# Patient Record
Sex: FEMALE | Race: WHITE
[De-identification: names, ages, dates, MRNs, and addresses within clinical notes are randomized per-mention and may not be internally consistent; named-entity substitution may affect disease eponyms.]

---

## 2018-10-18 ENCOUNTER — HOSPITAL ENCOUNTER (EMERGENCY)
Dept: HOSPITAL 38 - CC.ED | Age: 28
Discharge: HOME | End: 2018-10-18
Payer: MEDICAID

## 2018-10-18 VITALS — SYSTOLIC BLOOD PRESSURE: 152 MMHG | DIASTOLIC BLOOD PRESSURE: 80 MMHG

## 2018-10-18 DIAGNOSIS — Z34.93: Primary | ICD-10-CM

## 2018-10-18 DIAGNOSIS — Z3A.38: ICD-10-CM

## 2018-10-18 DIAGNOSIS — Z79.82: ICD-10-CM

## 2018-10-18 DIAGNOSIS — Z88.5: ICD-10-CM

## 2018-10-18 DIAGNOSIS — I10: ICD-10-CM

## 2018-10-18 DIAGNOSIS — Z91.040: ICD-10-CM

## 2018-10-18 NOTE — EDM.PDOC
ED HPI GENERAL MEDICAL PROBLEM





- General


Chief Complaint: OB/GYN Problem


Stated Complaint: "Having back and labor pain


Time Seen by Provider: 10/18/18 02:40


Source of Information: Reports: Patient


History Limitations: Reports: No Limitations





- History of Present Illness


INITIAL COMMENTS - FREE TEXT/NARRATIVE: 





Pt comes in tonight to determine if she is in labor or having [place].  

She states that she started to have contractions about midnight.  She says they 

lasted about 20 seconds and were at least 15 minutes apart.  Has not had any 

vaginal discharge, bleeding or noted that her water broke.  No urinary symptoms 

noted.  Is 38 2/7 weeks and is scheduled  with Dr. Maier on . Did see Dr. Maier last week.  She did try taking a shower and walking 

around and they continued every 15 minutes at home.  Is very comfortable 

between contractions visiting.  Pelvic exam done using sterile technique is 

very posterior cervix and very high. Nursing did fetal heart tones in the 140'

s.  Baby very active.


Onset: Gradual


Location: Reports: Abdomen


  ** Back


Pain Score (Numeric/FACES): 6





- Related Data


 Allergies











Allergy/AdvReac Type Severity Reaction Status Date / Time


 


acetaminophen [From Norco] Allergy  Shortness Verified 10/18/18 03:01





   of Breath  


 


codeine Allergy  Hives Verified 18 08:26


 


hydrocodone [From Norco] Allergy  Shortness Verified 10/18/18 03:01





   of Breath  


 


latex Allergy  Anaphylactic Verified 10/18/18 03:01





   Shock  











Home Meds: 


 Home Meds





Aspirin [Adult Low Dose Aspirin EC] 81 mg PO DAILY 10/18/18 [History]


Pnv with Ca,No.74/Iron/Fa [Prenatal Low Iron Tablet] 1 each PO DAILY 10/18/18 [

History]











Past Medical History





- Past Health History


Medical/Surgical History: Denies Medical/Surgical History


Cardiovascular History: Reports: Hypertension


OB/GYN History: Reports: Pregnancy, Other (See Below)


Other OB/GYN History: 2 c section


Psychiatric History: Reports: Depression


Endocrine/Metabolic History: Reports: Obesity/BMI 30+





- Past Surgical History


HEENT Surgical History: Reports: Tonsillectomy


Musculoskeletal Surgical History: Reports: Other (See Below)


Other Musculoskeletal Surgeries/Procedures:: knee surgery





Social & Family History





- Family History


Family Medical History: Noncontributory





- Caffeine Use


Caffeine Use: Reports: Coffee





ED ROS GENERAL





- Review of Systems


Review Of Systems: See Below


GI/Abdominal: Reports: Other (see HPI)


: Denies: Discharge





ED EXAM PREGNANCY





- Physical Exam


Exam: See Below


Exam Limited By: No Limitations


General Appearance: Alert, Mild Distress


Respiratory/Chest: No Respiratory Distress, Lungs Clear


Cardiovascular: Regular Rate, Rhythm, No Edema


GI/Abdominal Exam: Normal Bowel Sounds, Soft


 (Female) Exam: Other (on exam cervix is very posterior and very high to be 

felt.).  No: Vaginal Bleeding, Vaginal Discharge


Fetal Heart Tones: Present


Fetal Heart Tones per Min: 140


Fetal Movement: Active


Extremities: Normal Inspection, Normal Range of Motion, No Pedal Edema


Neurological: Alert, Oriented


Skin Exam: Warm, Dry, Intact





Course





- Vital Signs


Last Recorded V/S: 


 Last Vital Signs











Temp  96.9 F   10/18/18 02:58


 


Pulse  108 H  10/18/18 02:58


 


Resp  20   10/18/18 02:58


 


BP  152/80 H  10/18/18 02:58


 


Pulse Ox  99   10/18/18 02:58














- Re-Assessments/Exams


Free Text/Narrative Re-Assessment/Exam: 





10/18/18 03:40


Janice Starks RN did  in to check cervix and she states that the cervix is 

very high and posterior and unable to be felt also.





10/18/18 03:45


Contacted [facility] in [place] and discussed pt with staff and they 

recommend that she pushes fluids, take tylenol 500 mg, sit in warm tub and if 

contractions become about 5-7 minutes apart or if water breaks to come to 

[place] at that time.














Departure





- Departure


Time of Disposition: 03:45


Disposition: Home, Self-Care 01


Condition: Good


Clinical Impression: 


 Intrauterine pregnancy








- Discharge Information


*PRESCRIPTION DRUG MONITORING PROGRAM REVIEWED*: No


*COPY OF PRESCRIPTION DRUG MONITORING REPORT IN PATIENT MEAGAN: No


Instructions:   Labor and Birth Information, Easy-to-Read


Forms:  ED Department Discharge


Additional Instructions: 


when contractions become 5- minutes apart or if water breaks then needs to go 

to [facility]


Phone number for [facility] [phone] to call them directly


drink plenty of fluids and take 500 mg of tylenol


If contractions continue call Dr. Maier in the AM at the clinic to check in.








- Problem List & Annotations


(1) Intrauterine pregnancy


SNOMED Code(s): 37338623


   Code(s): Z34.90 - ENCNTR FOR SUPRVSN OF NORMAL PREGNANCY, UNSP, UNSP 

TRIMESTER   Status: Acute   Priority: High   





- Problem List Review


Problem List Initiated/Reviewed/Updated: Yes

## 2018-11-08 ENCOUNTER — HOSPITAL ENCOUNTER (EMERGENCY)
Dept: HOSPITAL 38 - CC.ED | Age: 28
Discharge: HOME | End: 2018-11-08
Payer: MEDICAID

## 2018-11-08 VITALS — DIASTOLIC BLOOD PRESSURE: 51 MMHG | SYSTOLIC BLOOD PRESSURE: 127 MMHG

## 2018-11-08 DIAGNOSIS — I10: ICD-10-CM

## 2018-11-08 DIAGNOSIS — F17.210: ICD-10-CM

## 2018-11-08 DIAGNOSIS — Z91.040: ICD-10-CM

## 2018-11-08 DIAGNOSIS — F32.9: ICD-10-CM

## 2018-11-08 DIAGNOSIS — R56.9: Primary | ICD-10-CM

## 2018-11-08 DIAGNOSIS — Z88.5: ICD-10-CM

## 2018-11-08 LAB
CHLORIDE SERPL-SCNC: 106 MEQ/L (ref 98–106)
CHLORIDE SERPL-SCNC: 107 MEQ/L (ref 98–106)
SODIUM SERPL-SCNC: 143 MEQ/L (ref 136–145)
SODIUM SERPL-SCNC: 143 MEQ/L (ref 136–145)

## 2018-11-08 PROCEDURE — 80048 BASIC METABOLIC PNL TOTAL CA: CPT

## 2018-11-08 PROCEDURE — 80305 DRUG TEST PRSMV DIR OPT OBS: CPT

## 2018-11-08 PROCEDURE — 82550 ASSAY OF CK (CPK): CPT

## 2018-11-08 PROCEDURE — 80053 COMPREHEN METABOLIC PANEL: CPT

## 2018-11-08 PROCEDURE — 86140 C-REACTIVE PROTEIN: CPT

## 2018-11-08 PROCEDURE — 36415 COLL VENOUS BLD VENIPUNCTURE: CPT

## 2018-11-08 PROCEDURE — 51702 INSERT TEMP BLADDER CATH: CPT

## 2018-11-08 PROCEDURE — 81001 URINALYSIS AUTO W/SCOPE: CPT

## 2018-11-08 PROCEDURE — 96374 THER/PROPH/DIAG INJ IV PUSH: CPT

## 2018-11-08 PROCEDURE — 85025 COMPLETE CBC W/AUTO DIFF WBC: CPT

## 2018-11-08 PROCEDURE — 99284 EMERGENCY DEPT VISIT MOD MDM: CPT

## 2018-11-08 PROCEDURE — 96361 HYDRATE IV INFUSION ADD-ON: CPT

## 2018-11-08 PROCEDURE — 70450 CT HEAD/BRAIN W/O DYE: CPT

## 2018-11-08 NOTE — EDM.PDOC
ED HPI GENERAL MEDICAL PROBLEM





- General


Chief Complaint: Neurological Problem


Stated Complaint: "seizure activity"


Time Seen by Provider: 18 03:59


Source of Information: Reports: Patient, EMS


History Limitations: Reports: No Limitations





- History of Present Illness


INITIAL COMMENTS - FREE TEXT/NARRATIVE: 





Patient presents to ER with reported seizure activity.   relates they 

were having a party with some friends for a birthday, had 2 drinks and about 5 

shots since 10 pm, which isn't an unusual occurrence for her as she does 

socially drink ETOH.  States friend had gone out to have a cigarette with her, 

returned back to the house, went limp and they laid her down on the floor.  

That is when she proceeded to have the seizure activity.  Had something similar 

happen 2 years ago but only lasted a couple minutes and she was fine after 

that.   She relates there is a family history of seizures.  No recent head 

trauma except he states he did have to hold her head when she was thrashing 

around.  No incontinence of bowel and bladder.  Ambulance reports that enroute 

to here, she had an episode of thrashing around, not true tonic clonic 

movement.  On my arrival, patient was thrashing around with her arms and legs 

but could actually get her attention and she would open eyes and look towards 

me.  Immediately after activity had stopped, patient able to answer questions.  

Had  on .  Is currently on Cephalexin due to a concern of 

an incisional infection.  Otherwise has not had any fevers, shortness of breath

, chest pain.  Has had occasional nausea.  


Onset: Today, Sudden


Duration: Minutes:


Location: Reports: Generalized


Associated Symptoms: Reports: Nausea/Vomiting, Seizure, Other (recent )

.  Denies: Fever/Chills


Treatments PTA: Reports: Other Medication(s) (cephalexin)





- Related Data


 Allergies











Allergy/AdvReac Type Severity Reaction Status Date / Time


 


acetaminophen [From Norco] Allergy  Shortness Verified 18 04:04





   of Breath  


 


codeine Allergy  Hives Verified 18 04:04


 


hydrocodone [From Norco] Allergy  Shortness Verified 18 04:04





   of Breath  


 


latex Allergy  Anaphylactic Verified 18 04:04





   Shock  











Home Meds: 


 Home Meds





. [No Known Home Meds]  18 [History]











Past Medical History





- Past Health History


Medical/Surgical History: Denies Medical/Surgical History


Cardiovascular History: Reports: Hypertension


OB/GYN History: Reports: Pregnancy, Other (See Below)


Other OB/GYN History: 2 c section


Psychiatric History: Reports: Depression


Endocrine/Metabolic History: Reports: Obesity/BMI 30+





- Past Surgical History


HEENT Surgical History: Reports: Tonsillectomy


Musculoskeletal Surgical History: Reports: Other (See Below)


Other Musculoskeletal Surgeries/Procedures:: knee surgery





Social & Family History





- Family History


Family Medical History: Noncontributory





- Caffeine Use


Caffeine Use: Reports: Coffee





ED ROS GENERAL





- Review of Systems


Review Of Systems: See Below


Constitutional: Reports: Weakness, Fatigue.  Denies: Fever, Chills, Malaise, 

Decreased Appetite


HEENT: Reports: Other (dry mouth).  Denies: Ear Pain, Sinus Problem, Vertigo


Respiratory: Denies: Shortness of Breath, Cough


Cardiovascular: Denies: Chest Pain, Edema, Lightheadedness


Endocrine: Reports: Fatigue


GI/Abdominal: Reports: Abdominal Pain, Nausea.  Denies: Vomiting


: Reports: No Symptoms


Musculoskeletal: Reports: No Symptoms


Skin: Reports: Other (low transverse incision, mild erythema noted)


Neurological: Reports: Seizure.  Denies: Confusion





- Physical Exam


Exam: See Below


Exam Limited By: No Limitations


General Appearance: Other (patient was thrashing about, arms legs flailing on 

my arrival.  Immediately after activity stopped, is able to answer questions.  

No typical postictal state)


Eye Exam: Bilateral Eye: EOMI, PERRL


Ears: Normal External Exam, Normal TMs


Nose: Normal Inspection


Throat/Mouth: Normal Inspection, Normal Oropharynx, Other (mucous membranes dry)


Head Exam: Normocephalic


Neck: Normal Inspection, Supple, Non-Tender


Respiratory/Chest: No Respiratory Distress, Lungs Clear, Normal Breath Sounds


Cardiovascular: Regular Rate, Rhythm


GI/Abdominal: Normal Bowel Sounds, Soft, Non-Tender


Neuro Exam (Abbreviated): Alert, Oriented, Normal Cognition, No Motor/Sensory 

Deficits


Extremities: Normal Inspection, Normal Capillary Refill


Skin Exam: Warm, Other (mild erythema noted to abdominal incision)





Course





- Vital Signs


Last Recorded V/S: 


 Last Vital Signs











Temp  96.9 F   18 08:15


 


Pulse  104 H  18 08:15


 


Resp  20   18 08:15


 


BP  127/51 L  18 08:15


 


Pulse Ox  99   18 08:15














- Orders/Labs/Meds


Orders: 


 Active Orders 24 hr











 Category Date Time Status


 


 Doty Catheter Insertion [Insert Urinary Catheter] [OM. Care  18 04:30 

Ordered





 PC] Q24H   


 


 Urinary Catheter Assessment [RC] ASDIRECTED Care  18 04:49 Active


 


 Head wo Cont [CT] Stat Exams  18 04:01 Taken











Labs: 


 Laboratory Tests











  18 Range/Units





  04:18 04:18 04:46 


 


WBC  7.7    (5.0-10.0)  10^3/uL


 


RBC  4.66    (4.00-5.50)  10^6/uL


 


Hgb  12.0    (12.0-16.0)  g/dL


 


Hct  37.5    (37.0-47.0)  %


 


MCV  80.5 L    (82.0-94.0)  fL


 


MCH  25.8 L    (27.0-32.0)  pg


 


MCHC  32.0 L    (33.0-38.0)  g/dL


 


RDW Coeff of Jf  15.7 H    (11.0-15.0)  %


 


Plt Count  284    (150-400)  10^3/uL


 


Neut % (Auto)  46.2    (35-85)  %


 


Lymph % (Auto)  43.4    (10-55)  %


 


Mono % (Auto)  7.4    (0-16)  %


 


Eos % (Auto)  2.7    (0-5)  %


 


Baso % (Auto)  0.3    (0-3)  %


 


Neut # (Auto)  3.55    (1.80-7.00)  10^3/uL


 


Lymph # (Auto)  3.34    (1.00-4.80)  10^3/uL


 


Mono # (Auto)  0.57    (0.00-0.80)  10^3/uL


 


Eos # (Auto)  0.21    (0.00-0.45)  10^3/uL


 


Baso # (Auto)  0.02    10^3/uL


 


Sodium   143   (136-145)  mEq/L


 


Potassium   2.9 L* D   (3.5-5.0)  mEq/L


 


Chloride   106   ()  mEq/L


 


Carbon Dioxide   20 L   (21-32)  mmol/L


 


BUN   14  D   (7-18)  mg/dL


 


Creatinine   0.8   (0.6-1.0)  mg/dL


 


Est Cr Clr Drug Dosing   83.54   mL/min


 


Estimated GFR (MDRD)   > 60   (>=60)  mL/min


 


Glucose   80   (75-99)  mg/dL


 


Calcium   8.1 L   (8.4-10.1)  mg/dL


 


Total Bilirubin   0.2   (0.0-1.0)  mg/dL


 


AST   22   (15-37)  U/L


 


ALT   28   (12-78)  U/L


 


Alkaline Phosphatase   104   ()  U/L


 


Creatine Kinase   106   ()  U/L


 


C-Reactive Protein   0.5   (0.2-0.8)  mg/dL


 


Total Protein   7.1   (6.4-8.2)  g/dL


 


Albumin   3.5   (3.4-5.0)  g/dL


 


Urine Color    Yellow  (YELLOW)  


 


Urine Appearance    Clear  (CLEAR)  


 


Urine pH    7.0  (4.5-8.0)  


 


Ur Specific Gravity    1.010  (1.003-1.020)  


 


Urine Protein    Negative  (NEGATIVE)  mg/dL


 


Urine Glucose (UA)    Negative  (NEGATIVE)  mg/dL


 


Urine Ketones    Negative  (NEGATIVE)  mg/dL


 


Urine Occult Blood    Negative  (NEGATIVE)  


 


Urine Nitrite    Negative  (NEGATIVE)  


 


Urine Bilirubin    Negative  (NEGATIVE)  


 


Urine Urobilinogen    0.2  (0.2-1.0)  EU/dL


 


Ur Leukocyte Esterase    Negative  (NEGATIVE)  


 


Urine RBC    Not seen  (0-5)  /HPF


 


Urine WBC    0-5  (0-5)  /HPF


 


Ur Squamous Epith Cells    Rare  (NOT SEEN)  /HPF


 


Urine Opiates Screen     (NEGATIVE)  


 


Ur Oxycodone Screen     (NEGATIVE)  


 


Urine Methadone Screen     (NEGATIVE)  


 


Ur Barbiturates Screen     (NEGATIVE)  


 


U Tricyclic Antidepress     (NEGATIVE)  


 


Ur Phencyclidine Scrn     (NEGATIVE)  


 


Ur Amphetamine Screen     (NEGATIVE)  


 


U Methamphetamines Scrn     (NEGATIVE)  


 


Urine MDMA Screen     (NEGATIVE)  


 


U Benzodiazepines Scrn     (NEGATIVE)  


 


Urine Cocaine Screen     (NEGATIVE)  


 


U Marijuana (THC) Screen     (NEGATIVE)  














  18 Range/Units





  04:46 09:15 


 


WBC    (5.0-10.0)  10^3/uL


 


RBC    (4.00-5.50)  10^6/uL


 


Hgb    (12.0-16.0)  g/dL


 


Hct    (37.0-47.0)  %


 


MCV    (82.0-94.0)  fL


 


MCH    (27.0-32.0)  pg


 


MCHC    (33.0-38.0)  g/dL


 


RDW Coeff of Jf    (11.0-15.0)  %


 


Plt Count    (150-400)  10^3/uL


 


Neut % (Auto)    (35-85)  %


 


Lymph % (Auto)    (10-55)  %


 


Mono % (Auto)    (0-16)  %


 


Eos % (Auto)    (0-5)  %


 


Baso % (Auto)    (0-3)  %


 


Neut # (Auto)    (1.80-7.00)  10^3/uL


 


Lymph # (Auto)    (1.00-4.80)  10^3/uL


 


Mono # (Auto)    (0.00-0.80)  10^3/uL


 


Eos # (Auto)    (0.00-0.45)  10^3/uL


 


Baso # (Auto)    10^3/uL


 


Sodium   143  (136-145)  mEq/L


 


Potassium   4.2  D  (3.5-5.0)  mEq/L


 


Chloride   107 H  ()  mEq/L


 


Carbon Dioxide   22  (21-32)  mmol/L


 


BUN   11  (7-18)  mg/dL


 


Creatinine   0.7  (0.6-1.0)  mg/dL


 


Est Cr Clr Drug Dosing   95.48  mL/min


 


Estimated GFR (MDRD)   > 60  (>=60)  mL/min


 


Glucose   94  (75-99)  mg/dL


 


Calcium   7.6 L  (8.4-10.1)  mg/dL


 


Total Bilirubin    (0.0-1.0)  mg/dL


 


AST    (15-37)  U/L


 


ALT    (12-78)  U/L


 


Alkaline Phosphatase    ()  U/L


 


Creatine Kinase    ()  U/L


 


C-Reactive Protein    (0.2-0.8)  mg/dL


 


Total Protein    (6.4-8.2)  g/dL


 


Albumin    (3.4-5.0)  g/dL


 


Urine Color    (YELLOW)  


 


Urine Appearance    (CLEAR)  


 


Urine pH    (4.5-8.0)  


 


Ur Specific Gravity    (1.003-1.020)  


 


Urine Protein    (NEGATIVE)  mg/dL


 


Urine Glucose (UA)    (NEGATIVE)  mg/dL


 


Urine Ketones    (NEGATIVE)  mg/dL


 


Urine Occult Blood    (NEGATIVE)  


 


Urine Nitrite    (NEGATIVE)  


 


Urine Bilirubin    (NEGATIVE)  


 


Urine Urobilinogen    (0.2-1.0)  EU/dL


 


Ur Leukocyte Esterase    (NEGATIVE)  


 


Urine RBC    (0-5)  /HPF


 


Urine WBC    (0-5)  /HPF


 


Ur Squamous Epith Cells    (NOT SEEN)  /HPF


 


Urine Opiates Screen  Negative   (NEGATIVE)  


 


Ur Oxycodone Screen  Negative   (NEGATIVE)  


 


Urine Methadone Screen  Negative   (NEGATIVE)  


 


Ur Barbiturates Screen  Negative   (NEGATIVE)  


 


U Tricyclic Antidepress  Negative   (NEGATIVE)  


 


Ur Phencyclidine Scrn  Negative   (NEGATIVE)  


 


Ur Amphetamine Screen  Negative   (NEGATIVE)  


 


U Methamphetamines Scrn  Negative   (NEGATIVE)  


 


Urine MDMA Screen  Negative   (NEGATIVE)  


 


U Benzodiazepines Scrn  Positive H   (NEGATIVE)  


 


Urine Cocaine Screen  Negative   (NEGATIVE)  


 


U Marijuana (THC) Screen  Negative   (NEGATIVE)  











Meds: 


Medications














Discontinued Medications














Generic Name Dose Route Start Last Admin





  Trade Name Freq  PRN Reason Stop Dose Admin


 


Sodium Chloride  1,000 mls @ 999 mls/hr  18 04:14  18 04:30





  Normal Saline  IV  18 05:14  999 mls/hr





  .BOLUS ONE   Administration





     





     





     





     


 


Potassium Chloride/Sodium Chloride  1,000 mls @ 250 mls/hr  18 05:00   05:03





  1/2 Ns With 20 Meq Kcl  IV   250 mls/hr





  ASDIRECTED KIMBER   Administration





     





     





     





     


 


Lorazepam  1 mg  18 04:02  18 03:49





  Ativan  IVPUSH  18 04:03  1 mg





  ONETIME ONE   Administration





     





     





     





     


 


Lorazepam  1 mg  18 04:18  18 04:10





  Ativan  IVPUSH  18 04:19  1 mg





  ONETIME ONE   Administration





     





     





     





     


 


Lorazepam  1 mg  18 04:59  





  Ativan  IVPUSH   





  ASDIRECTED PRN   





  Seizures   





     





     





     














- Re-Assessments/Exams


Free Text/Narrative Re-Assessment/Exam: 





18 04:20


Second event of atypical seizure activity noted.  Does not respond during, last 

approximately 45 seconds.  No bowel or bladder incontinence.  Does answer 

questions after activity has stopped.


18 05:00


Contacted [facility] and spoke with Dr. Tripathi.  Suggested to call OB/GYN due to 

concerns post delivery/eclampsia.  Spoke with Dr. Young at [place].  History

, labs and vital signs given.  Does not feel related to eclampsia.  





Patient is resting quietly now.  No further seizure activity.  Will keep 

extended ER and consult with neuro.  


18 09:34


Doing well this am.  Will arrange for MRI next week and neurology consult.  

Follow up if any recurring events. 





Departure





- Departure


Time of Disposition: 09:35


Disposition: Home, Self-Care 01


Condition: Good


Clinical Impression: 


 Seizure








- Discharge Information


*PRESCRIPTION DRUG MONITORING PROGRAM REVIEWED*: No


*COPY OF PRESCRIPTION DRUG MONITORING REPORT IN PATIENT MEAGAN: No


Referrals: 


Provider,Unknown [Ordering Only Provider] - 


Forms:  ED Department Discharge


Additional Instructions: 


1.  Rest


2.  Push fluids


3.  MRI of the brain next week


4.  NEurology consult- Will call you when this has been arranged


5.  Return to primary care provider if ongoing concerns or recurring events.





- My Orders


Last 24 Hours: 


My Active Orders





18 04:01


Head wo Cont [CT] Stat 





18 04:30


Doty Catheter Insertion [Insert Urinary Catheter] [OM.PC] Q24H 





18 04:49


Urinary Catheter Assessment [RC] ASDIRECTED 














- Assessment/Plan


Last 24 Hours: 


My Active Orders





18 04:01


Head wo Cont [CT] Stat 





18 04:30


Doty Catheter Insertion [Insert Urinary Catheter] [OM.PC] Q24H 





18 04:49


Urinary Catheter Assessment [RC] ASDIRECTED

## 2019-01-18 ENCOUNTER — HOSPITAL ENCOUNTER (EMERGENCY)
Dept: HOSPITAL 38 - CC.ED | Age: 29
Discharge: HOME | End: 2019-01-18
Payer: MEDICAID

## 2019-01-18 VITALS — SYSTOLIC BLOOD PRESSURE: 150 MMHG | DIASTOLIC BLOOD PRESSURE: 63 MMHG

## 2019-01-18 DIAGNOSIS — I10: ICD-10-CM

## 2019-01-18 DIAGNOSIS — L76.31: Primary | ICD-10-CM

## 2019-01-18 DIAGNOSIS — Z79.899: ICD-10-CM

## 2019-01-18 DIAGNOSIS — F41.9: ICD-10-CM

## 2019-01-18 DIAGNOSIS — F32.9: ICD-10-CM

## 2019-01-18 NOTE — EDM.PDOC
ED HPI GENERAL MEDICAL PROBLEM





- General


Chief Complaint: Abdominal Pain


Stated Complaint: incision swelling and bruising


Time Seen by Provider: 19 21:20


Source of Information: Reports: Patient


History Limitations: Reports: No Limitations





- History of Present Illness


INITIAL COMMENTS - FREE TEXT/NARRATIVE: 





Lashae is a 28 year old female who presents to the ED with c/o bruising and 

swelling following surgery. She reports she had a panniculectomy with Dr. Linn 

at [facility] [place], [place] on Monday. Reports she was in to see their nurse today and they were 

unable to remove the NAUN drain as she was having increased drainage. She reports 

she was advised to go to the ED if she had any bruising or swelling. Reports 

this afternoon/evening she noticed significant swelling in her suprapubic and 

shaneka area. Reports her vaginal lips are even swollen. Reports prior to today 

she had no bruising to the area and this has worsened throughout the evening. 

Reports increased pain. Denies any chest pain, shortness of breath, fever, 

chills, abdominal pain. 


  ** Uterine


Pain Score (Numeric/FACES): 6





- Related Data


 Allergies











Allergy/AdvReac Type Severity Reaction Status Date / Time


 


codeine Allergy  Hives Verified 19 21:02


 


hydrocodone [From Norco] Allergy  Shortness Verified 19 21:02





   of Breath  


 


latex Allergy  Anaphylactic Verified 19 21:02





   Shock  











Home Meds: 


 Home Meds





Ibuprofen [Ibu] 800 mg PO TID 19 [History]


Sertraline HCl [Zoloft] 150 mg PO DAILY 19 [History]


hydrOXYzine pamoate [Vistaril] 100 mg PO DAILY PRN 19 [History]


oxyCODONE HCl/Acetaminophen [Percocet 5-325 mg Tablet] 1 - 2 tab PO Q4H PRN  [History]











Past Medical History





- Past Health History


Medical/Surgical History: Denies Medical/Surgical History


HEENT History: Reports: Otitis Media


Cardiovascular History: Reports: Hypertension


Genitourinary History: Reports: None


OB/GYN History: Reports: Polycystic Ovaries, Pregnancy


Other OB/GYN History: 2 c section


Musculoskeletal History: Reports: Fracture


Neurological History: Reports: Seizure


Psychiatric History: Reports: Anxiety, Depression


Endocrine/Metabolic History: Reports: Obesity/BMI 30+


Hematologic History: Reports: Anemia





- Infectious Disease History


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





- Past Surgical History


HEENT Surgical History: Reports: Adenoidectomy, Myringotomy w Tube(s), 

Tonsillectomy


Cardiovascular Surgical History: Reports: None


Female  Surgical History: Reports:  Section, Tubal Ligation


Endocrine Surgical History: Reports: None


Neurological Surgical History: Reports: None


Musculoskeletal Surgical History: Reports: Other (See Below)


Other Musculoskeletal Surgeries/Procedures:: knee surgery





Social & Family History





- Family History


Family Medical History: Noncontributory





- Tobacco Use


Smoking Status *Q: Current Every Day Smoker


Years of Tobacco use: 15


Packs/Tins Daily: 0.5





- Caffeine Use


Caffeine Use: Reports: Soda





- Recreational Drug Use


Recreational Drug Use: No





ED ROS GENERAL





- Review of Systems


Review Of Systems: ROS reveals no pertinent complaints other than HPI.





ED EXAM, GI/ABD





- Physical Exam


Exam: See Below


Exam Limited By: No Limitations


General Appearance: Alert, WD/WN, No Apparent Distress


Neck: Normal Inspection, Supple, Non-Tender, Full Range of Motion


Respiratory/Chest: No Respiratory Distress, Lungs Clear, Normal Breath Sounds, 

No Accessory Muscle Use, Chest Non-Tender


Cardiovascular: Normal Peripheral Pulses, Regular Rate, Rhythm, No Edema, No 

Gallop, No JVD, No Murmur, No Rub


GI/Abdominal Exam: Normal Bowel Sounds, Soft, Tender (throughout), Other (wound 

vac & NAUN drain in place, sanginous drainage from NAUN, ecchymosis and swelling of 

suprapubic area)


 (Female) Exam: Other (swelling & bruising of labia majora)


Neurological: Alert, Oriented, CN II-XII Intact, Normal Cognition, Normal Gait, 

Normal Reflexes, No Motor/Sensory Deficits


Psychiatric: Normal Affect, Normal Mood


Skin Exam: Ecchymosis (lower abdomen & periarea ), Other (wound vac to lower 

abdomen, NAUN drain ).  No: Increased Warmth





Course





- Vital Signs


Last Recorded V/S: 


 Last Vital Signs











Temp  97.7 F   19 21:02


 


Pulse  92   19 21:02


 


Resp  18   19 21:02


 


BP  150/63 H  19 21:02


 


Pulse Ox  100   19 21:02














- Re-Assessments/Exams


Free Text/Narrative Re-Assessment/Exam: 





01/18/19 21:38


Discussed case with Dr. Morales (on call general surgeon at [facility] [place], [place]) who reports 

he is aware of case. Reports this is normal finding post op. Follow up as 

scheduled with Dr. Linn. 








Departure





- Departure


Time of Disposition: 21:41


Disposition: Home, Self-Care 01


Condition: Good


Clinical Impression: 


 Status post panniculectomy








- Discharge Information


*PRESCRIPTION DRUG MONITORING PROGRAM REVIEWED*: Not Applicable


*COPY OF PRESCRIPTION DRUG MONITORING REPORT IN PATIENT MEAGAN: Not Applicable


Referrals: 


Angelica Maier MD [Primary Care Provider] - 


Forms:  ED Department Discharge


Additional Instructions: 


Continue recommendations per surgical team


Continue pain medications as prescribed


Follow up as scheduled Monday with Dr. Linn

## 2019-01-26 ENCOUNTER — HOSPITAL ENCOUNTER (EMERGENCY)
Dept: HOSPITAL 38 - CC.ED | Age: 29
Discharge: HOME | End: 2019-01-26
Payer: MEDICAID

## 2019-01-26 VITALS — DIASTOLIC BLOOD PRESSURE: 84 MMHG | SYSTOLIC BLOOD PRESSURE: 152 MMHG

## 2019-01-26 VITALS — DIASTOLIC BLOOD PRESSURE: 86 MMHG | SYSTOLIC BLOOD PRESSURE: 155 MMHG

## 2019-01-26 DIAGNOSIS — L76.34: Primary | ICD-10-CM

## 2019-01-26 DIAGNOSIS — Z88.5: ICD-10-CM

## 2019-01-26 DIAGNOSIS — Z98.890: ICD-10-CM

## 2019-01-26 DIAGNOSIS — Z91.040: ICD-10-CM

## 2019-01-26 DIAGNOSIS — Z88.6: ICD-10-CM

## 2019-01-26 DIAGNOSIS — E66.9: ICD-10-CM

## 2019-01-26 DIAGNOSIS — I10: ICD-10-CM

## 2019-01-26 DIAGNOSIS — F32.9: ICD-10-CM

## 2019-01-26 DIAGNOSIS — F17.210: ICD-10-CM

## 2019-01-26 PROCEDURE — 99282 EMERGENCY DEPT VISIT SF MDM: CPT

## 2019-01-26 PROCEDURE — 96372 THER/PROPH/DIAG INJ SC/IM: CPT

## 2019-01-26 NOTE — EDM.PDOC
ED HPI GENERAL MEDICAL PROBLEM





- General


Chief Complaint: Skin Complaint


Stated Complaint: my seroma is still pain and still bleeding


Time Seen by Provider: 19 20:39


Source of Information: Reports: Patient


History Limitations: Reports: No Limitations





- History of Present Illness


Onset: Gradual


Location: Reports: Other (drainage from incision)


Quality: Reports: Pressure


Severity: Mild


Improves with: Reports: None


Worsens with: Reports: None


Associated Symptoms: Reports: No Other Symptoms


Treatments PTA: Reports: Other (see below)


Other Treatments PTA: percocet


  ** Incisional


Pain Score (Numeric/FACES): 8





- Related Data


 Allergies











Allergy/AdvReac Type Severity Reaction Status Date / Time


 


codeine Allergy  Hives Verified 19 20:29


 


hydrocodone [From Norco] Allergy  Shortness Verified 19 20:29





   of Breath  


 


latex Allergy  Anaphylactic Verified 19 20:29





   Shock  











Home Meds: 


 Home Meds





Ibuprofen [Ibu] 800 mg PO TID 19 [History]


Sertraline HCl [Zoloft] 150 mg PO DAILY 19 [History]


hydrOXYzine pamoate [Vistaril] 100 mg PO DAILY PRN 19 [History]


oxyCODONE HCl/Acetaminophen [Percocet 5-325 mg Tablet] 1 each PO ASDIRECTED PRN 

19 [History]











Past Medical History





- Past Health History


Medical/Surgical History: Denies Medical/Surgical History


HEENT History: Reports: Otitis Media


Cardiovascular History: Reports: Hypertension


Genitourinary History: Reports: None


OB/GYN History: Reports: Polycystic Ovaries, Pregnancy


Other OB/GYN History: 3 c-sections


Musculoskeletal History: Reports: Fracture


Neurological History: Reports: Seizure


Psychiatric History: Reports: Anxiety, Depression


Endocrine/Metabolic History: Reports: Obesity/BMI 30+


Hematologic History: Reports: Anemia





- Infectious Disease History


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





- Past Surgical History


HEENT Surgical History: Reports: Adenoidectomy, Myringotomy w Tube(s), 

Tonsillectomy


Cardiovascular Surgical History: Reports: None


GI Surgical History: Reports: Other (See Below)


Other GI Surgeries/Procedures: punn


Female  Surgical History: Reports:  Section, Tubal Ligation


Endocrine Surgical History: Reports: None


Neurological Surgical History: Reports: None


Musculoskeletal Surgical History: Reports: Other (See Below)


Other Musculoskeletal Surgeries/Procedures:: knee surgery





Social & Family History





- Family History


Family Medical History: Noncontributory





- Caffeine Use


Caffeine Use: Reports: Soda





ED ROS GENERAL





- Review of Systems


Review Of Systems: See Below


Constitutional: Reports: No Symptoms.  Denies: Fever, Chills


HEENT: Reports: No Symptoms


Respiratory: Reports: No Symptoms.  Denies: Shortness of Breath, Wheezing


Cardiovascular: Reports: No Symptoms.  Denies: Chest Pain


Endocrine: Reports: No Symptoms


GI/Abdominal: Reports: No Symptoms, Other (abd is soft, some tenderness over 

incision area, no redness or heat from incision area, do not suspect abscess).  

Denies: Abdominal Pain


: Reports: No Symptoms


Musculoskeletal: Reports: No Symptoms


Skin: Reports: Other (as above, very scant amount of drainage noted)


Neurological: Reports: No Symptoms


Psychiatric: Reports: No Symptoms





ED EXAM, SKIN/RASH


Exam: See Below


Exam Limited By: No Limitations


General Appearance: Alert, WD/WN, No Apparent Distress


Respiratory/Chest: No Respiratory Distress, Lungs Clear, Normal Breath Sounds


Cardiovascular: Normal Peripheral Pulses, Regular Rate, Rhythm, No Edema


Peripheral Pulses: 2+: Radial (L)


GI/Abdominal: Normal Bowel Sounds, Soft, Tender (over incision area)


Back Exam: Normal Inspection, Full Range of Motion


Extremities: Normal Inspection, Normal Range of Motion, Non-Tender, No Pedal 

Edema, Normal Capillary Refill


Neurological: Alert, Oriented, Normal Cognition, Normal Gait


Psychiatric: Normal Affect, Normal Mood


Skin: Warm, Dry, Intact, Normal Color, No Rash, Wound/Incision (no redness or 

heat)





Course





- Vital Signs


Last Recorded V/S: 





 Last Vital Signs











Temp  36.7 C   19 20:25


 


Pulse  111 H  19 20:25


 


Resp  16   19 20:25


 


BP  155/86 H  19 20:25


 


Pulse Ox  100   19 20:25














Departure





- Departure


Time of Disposition: 20:50


Disposition: Home, Self-Care 01


Condition: Good


Clinical Impression: 


 Seroma after procedure








- Discharge Information


*PRESCRIPTION DRUG MONITORING PROGRAM REVIEWED*: Not Applicable


*COPY OF PRESCRIPTION DRUG MONITORING REPORT IN PATIENT MEAGAN: Not Applicable


Instructions:  Seroma


Additional Instructions: 


follow up with your surgeon on monday


continue your current medications


return to the ED as needed





- Problem List & Annotations


(1) Seroma after procedure


SNOMED Code(s): 148479157218134


   Code(s): MXP3731 -    Status: Acute   Priority: Medium   





- Problem List Review


Problem List Initiated/Reviewed/Updated: Yes





- Assessment/Plan


Assessment:: 





will continue tx plan, will f/u with surgeon on monday 


Plan: 





return to the ED as needed

## 2019-01-26 NOTE — EDM.PDOC
ED HPI GENERAL MEDICAL PROBLEM





- General


Chief Complaint: Abdominal Pain


Stated Complaint: bleeding from incision


Time Seen by Provider: 19 10:42


Source of Information: Reports: Patient


History Limitations: Reports: No Limitations





- History of Present Illness


INITIAL COMMENTS - FREE TEXT/NARRATIVE: 





in with c/o had abd surg on anamaria 15, 2019 in [place], today had increase pain 

to the surgical site and then notice some bleeding, no fever or chills, no 

chest pain or sob, no neck/back pain or stiffness, no other sx.  


Onset: Today


Onset Date: 19


Location: Reports: Abdomen (mid incision drainage)


Quality: Reports: Pressure


Severity: Moderate


Improves with: Reports: None


Worsens with: Reports: Movement


Associated Symptoms: Reports: No Other Symptoms.  Denies: Fever/Chills, 

Headaches, Loss of Appetite, Nausea/Vomiting, Weakness


Treatments PTA: Reports: Other (see below) (none)


  ** Left Abdominal


Pain Score (Numeric/FACES): 8





- Related Data


 Allergies











Allergy/AdvReac Type Severity Reaction Status Date / Time


 


codeine Allergy  Hives Verified 19 10:12


 


hydrocodone [From Norco] Allergy  Shortness Verified 19 10:12





   of Breath  


 


latex Allergy  Anaphylactic Verified 19 10:12





   Shock  











Home Meds: 


 Home Meds





Ibuprofen [Ibu] 800 mg PO TID 19 [History]


Sertraline HCl [Zoloft] 150 mg PO DAILY 19 [History]


hydrOXYzine pamoate [Vistaril] 100 mg PO DAILY PRN 19 [History]











Past Medical History





- Past Health History


Medical/Surgical History: Denies Medical/Surgical History


HEENT History: Reports: Otitis Media


Cardiovascular History: Reports: Hypertension


Genitourinary History: Reports: None


OB/GYN History: Reports: Polycystic Ovaries, Pregnancy


Other OB/GYN History: 3 c-sections


Musculoskeletal History: Reports: Fracture


Neurological History: Reports: Seizure


Psychiatric History: Reports: Anxiety, Depression


Endocrine/Metabolic History: Reports: Obesity/BMI 30+


Hematologic History: Reports: Anemia





- Infectious Disease History


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





- Past Surgical History


HEENT Surgical History: Reports: Adenoidectomy, Myringotomy w Tube(s), 

Tonsillectomy


Cardiovascular Surgical History: Reports: None


GI Surgical History: Reports: Other (See Below)


Other GI Surgeries/Procedures: punn


Female  Surgical History: Reports:  Section, Tubal Ligation


Endocrine Surgical History: Reports: None


Neurological Surgical History: Reports: None


Musculoskeletal Surgical History: Reports: Other (See Below)


Other Musculoskeletal Surgeries/Procedures:: knee surgery





Social & Family History





- Family History


Family Medical History: Noncontributory





- Tobacco Use


Smoking Status *Q: Current Every Day Smoker


Years of Tobacco use: 12


Packs/Tins Daily: 0.5





- Caffeine Use


Caffeine Use: Reports: Soda





- Recreational Drug Use


Recreational Drug Use: No





ED ROS GENERAL





- Review of Systems


Review Of Systems: See Below


Constitutional: Reports: No Symptoms.  Denies: Fever, Chills


HEENT: Reports: No Symptoms


Respiratory: Reports: No Symptoms.  Denies: Shortness of Breath


Cardiovascular: Reports: No Symptoms.  Denies: Chest Pain


Endocrine: Reports: No Symptoms


GI/Abdominal: Reports: Abdominal Pain (from incision drainage)


: Reports: No Symptoms


Musculoskeletal: Reports: No Symptoms


Skin: Reports: No Symptoms, Other (drainage from incision)


Neurological: Reports: No Symptoms


Psychiatric: Reports: No Symptoms


Hematologic/Lymphatic: Reports: No Symptoms


Immunologic: Reports: No Symptoms





ED EXAM, SKIN/RASH


Exam: See Below


Exam Limited By: No Limitations


General Appearance: Alert, WD/WN, Mild Distress


Ears: Normal External Exam


Nose: Normal Inspection


Throat/Mouth: Normal Inspection, Normal Lips


Head: Atraumatic, Normocephalic


Neck: Normal Inspection, Supple, Non-Tender


Respiratory/Chest: No Respiratory Distress, Lungs Clear, Normal Breath Sounds, 

No Accessory Muscle Use, Chest Non-Tender


Cardiovascular: Normal Peripheral Pulses, Regular Rate, Rhythm, No Edema, No 

Murmur


Peripheral Pulses: 2+: Radial (L)


GI/Abdominal: Normal Bowel Sounds, Soft, Tender (around mid abd incision)


Back Exam: Normal Inspection, Full Range of Motion


Extremities: Normal Inspection, Normal Range of Motion, Non-Tender, Normal 

Capillary Refill


Neurological: Alert, Oriented, Normal Cognition, Normal Gait, No Motor/Sensory 

Deficits


Psychiatric: Normal Affect, Normal Mood


Skin: Warm, Dry, Intact, Normal Color, Other (serosanguineous drainage from 

incision)





Course





- Vital Signs


Text/Narrative:: 





was able to express the serosanguineous drainage from the wound area, no 

further drainage noted now, advised is feeling better, will be dc home and 

advised to f/u with surgeon on monday, advised there can be more drainage. 

advised to return to ER sooner if worse or problems 


Last Recorded V/S: 


 Last Vital Signs











Temp  36.4 C   19 10:04


 


Pulse  122 H  19 10:04


 


Resp  16   19 10:04


 


BP  152/84 H  19 10:04


 


Pulse Ox  98   19 10:04














- Orders/Labs/Meds


Meds: 


Medications














Discontinued Medications














Generic Name Dose Route Start Last Admin





  Trade Name Boby  PRN Reason Stop Dose Admin


 


Morphine Sulfate  5 mg  19 10:44  19 10:48





  Morphine  IM  19 10:45  5 mg





  ONETIME ONE   Administration





     





     





     





     














Departure





- Departure


Time of Disposition: 11:09


Disposition: Home, Self-Care 01


Condition: Good


Clinical Impression: 


 Seroma








- Discharge Information


*PRESCRIPTION DRUG MONITORING PROGRAM REVIEWED*: Yes


*COPY OF PRESCRIPTION DRUG MONITORING REPORT IN PATIENT MEAGAN: No


Instructions:  Seroma


Referrals: 


Angelica Maier MD [Primary Care Provider] - 


Forms:  ED Department Discharge


Additional Instructions: 


continue current medications


follow up with surgeon on monday


return to ER as needed





- Problem List & Annotations


(1) Seroma


SNOMED Code(s): 335312442


   Code(s): NLD0058 -    Status: Acute   Priority: Medium   Current Visit: Yes 

  





- Problem List Review


Problem List Initiated/Reviewed/Updated: Yes





- Assessment/Plan


Plan: 





as above

## 2019-02-16 ENCOUNTER — HOSPITAL ENCOUNTER (EMERGENCY)
Dept: HOSPITAL 38 - CC.ED | Age: 29
Discharge: ANOTHER HOSPITAL | End: 2019-02-16
Payer: MEDICAID

## 2019-02-16 VITALS — DIASTOLIC BLOOD PRESSURE: 80 MMHG | SYSTOLIC BLOOD PRESSURE: 141 MMHG

## 2019-02-16 DIAGNOSIS — F17.210: ICD-10-CM

## 2019-02-16 DIAGNOSIS — Z91.040: ICD-10-CM

## 2019-02-16 DIAGNOSIS — E66.9: ICD-10-CM

## 2019-02-16 DIAGNOSIS — Z88.5: ICD-10-CM

## 2019-02-16 DIAGNOSIS — L76.22: Primary | ICD-10-CM

## 2019-02-16 DIAGNOSIS — Z48.817: ICD-10-CM

## 2019-02-16 DIAGNOSIS — I10: ICD-10-CM

## 2019-02-16 LAB
CHLORIDE SERPL-SCNC: 104 MEQ/L (ref 98–106)
SODIUM SERPL-SCNC: 142 MEQ/L (ref 136–145)

## 2019-02-16 RX ADMIN — FENTANYL CITRATE ONE MCG: 50 INJECTION, SOLUTION INTRAMUSCULAR; INTRAVENOUS at 19:50

## 2019-02-16 RX ADMIN — FENTANYL CITRATE PRN MCG: 50 INJECTION, SOLUTION INTRAMUSCULAR; INTRAVENOUS at 19:15

## 2019-02-16 RX ADMIN — FENTANYL CITRATE ONE MCG: 50 INJECTION, SOLUTION INTRAMUSCULAR; INTRAVENOUS at 20:44

## 2019-02-16 NOTE — EDM.PDOC
ED HPI GENERAL MEDICAL PROBLEM





- General


Chief Complaint: Abdominal Pain


Stated Complaint: PAIN IN ABDOMEN AND BLEEDING INCISION


Time Seen by Provider: 19 18:45


Source of Information: Reports: Patient


History Limitations: Reports: No Limitations





- History of Present Illness


INITIAL COMMENTS - FREE TEXT/NARRATIVE: 





Patient presents with bleeding from her abdominal incision and abdominal pain.  

She states she had a  on .  Ultimately had increased 

discomfort and on , they removed a necrotic lesion from her 

abdomen.  Continued to have problems and on , reopened the wound 

due to infection and cleaned that out.  Had MRSA in the wound.  Just finished a 

10 day course of antibiotics.  Laid down this afternoon to nap and rest due to 

pain and awoke to bandages being soaked with blood.  Notes there was firmness 

along the incision line and now there is bruising and the firmness has extended 

up below the rib cage.  Has not had a fever


Onset: Today, Sudden


Duration: Hour(s):


Location: Reports: Abdomen


Quality: Reports: Burning, Sharp


Severity: Moderate


Improves with: Reports: None


Worsens with: Reports: Movement


Associated Symptoms: Reports: Nausea/Vomiting.  Denies: Chest Pain, Cough, Fever

/Chills, Loss of Appetite, Shortness of Breath


  ** Lower Abdomen


Pain Score (Numeric/FACES): 8





- Related Data


 Allergies











Allergy/AdvReac Type Severity Reaction Status Date / Time


 


codeine Allergy  Hives Verified 19 18:22


 


hydrocodone [From Norco] Allergy  Shortness Verified 19 18:22





   of Breath  


 


latex Allergy  Anaphylactic Verified 19 18:22





   Shock  











Home Meds: 


 Home Meds





Ibuprofen [Ibu] 800 mg PO TID 19 [History]


Sertraline HCl [Zoloft] 150 mg PO DAILY 19 [History]


hydrOXYzine pamoate [Vistaril] 100 mg PO DAILY PRN 19 [History]


oxyCODONE HCl/Acetaminophen [Percocet 5-325 mg Tablet] 1 each PO ASDIRECTED PRN 

19 [History]











Past Medical History





- Past Health History


Medical/Surgical History: Denies Medical/Surgical History


HEENT History: Reports: Otitis Media


Cardiovascular History: Reports: Hypertension


Genitourinary History: Reports: None


OB/GYN History: Reports: Polycystic Ovaries, Pregnancy


Other OB/GYN History: 3 c-sections


Musculoskeletal History: Reports: Fracture


Neurological History: Reports: Seizure


Psychiatric History: Reports: Anxiety, Depression


Endocrine/Metabolic History: Reports: Obesity/BMI 30+


Hematologic History: Reports: Anemia





- Infectious Disease History


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





- Past Surgical History


HEENT Surgical History: Reports: Adenoidectomy, Myringotomy w Tube(s), 

Tonsillectomy


Cardiovascular Surgical History: Reports: None


GI Surgical History: Reports: Other (See Below)


Other GI Surgeries/Procedures: punn


Female  Surgical History: Reports:  Section, Tubal Ligation


Endocrine Surgical History: Reports: None


Neurological Surgical History: Reports: None


Musculoskeletal Surgical History: Reports: Other (See Below)


Other Musculoskeletal Surgeries/Procedures:: knee surgery


Dermatological Surgical History: Reports: Other (See Below)





Social & Family History





- Family History


Family Medical History: Noncontributory





- Tobacco Use


Smoking Status *Q: Current Every Day Smoker


Years of Tobacco use: 14


Packs/Tins Daily: 0.5





- Caffeine Use


Caffeine Use: Reports: Soda





- Recreational Drug Use


Recreational Drug Use: No





ED ROS GENERAL





- Review of Systems


Review Of Systems: See Below


Constitutional: Denies: Fever, Chills, Malaise, Decreased Appetite


HEENT: Reports: No Symptoms


Respiratory: Denies: Shortness of Breath, Cough


Cardiovascular: Denies: Chest Pain, Edema, Lightheadedness


Endocrine: Denies: Fatigue


GI/Abdominal: Reports: Abdominal Pain, Diarrhea, Distension.  Denies: Nausea, 

Vomiting


: Reports: No Symptoms


Musculoskeletal: Reports: No Symptoms


Skin: Reports: Bruising, Other (bleeding from her abdominal incision)





ED EXAM, GI/ABD





- Physical Exam


Exam: See Below


Exam Limited By: No Limitations


General Appearance: Alert, WD/WN, Mild Distress


Ears: Normal External Exam, Normal TMs


Nose: Normal Inspection, Normal Mucosa, No Blood


Throat/Mouth: Normal Inspection, Normal Oropharynx


Head: Normocephalic


Neck: Normal Inspection, Supple, Non-Tender


Respiratory/Chest: No Respiratory Distress, Lungs Clear, Normal Breath Sounds


Cardiovascular: Regular Rate, Rhythm


GI/Abdominal Exam: Normal Bowel Sounds, Tender, Other (abdomen is firm along 

the incision line, oozing from the incision.  Has tenderness/distention to the 

right upper and lower quadrant as well)


Neurological: Alert, Oriented


Skin Exam: Warm, Dry





Course





- Vital Signs


Last Recorded V/S: 


 Last Vital Signs











Temp  97.1 F   19 18:17


 


Pulse  127 H  19 18:17


 


Resp  20   19 18:17


 


BP  149/90 H  19 18:17


 


Pulse Ox  100   19 18:17














- Orders/Labs/Meds


Orders: 


 Active Orders 24 hr











 Category Date Time Status


 


 Abdomen Pelvis w Cont [CT] Routine Exams  19 Taken


 


 fentaNYL [Sublimaze] Med  19 18:54 Active





 50 mcg IVPUSH Q6H PRN   








 Medication Orders





Fentanyl (Sublimaze)  50 mcg IVPUSH Q6H PRN


   PRN Reason: Pain


   Last Admin: 19 19:15  Dose: 50 mcg








Labs: 


 Laboratory Tests











  19 Range/Units





  18:29 18:35 18:35 


 


WBC   14.3 H   (5.0-10.0)  10^3/uL


 


RBC   3.52 L   (4.00-5.50)  10^6/uL


 


Hgb   8.5 L   (12.0-16.0)  g/dL


 


Hct   27.6 L   (37.0-47.0)  %


 


MCV   78.4 L   (82.0-94.0)  fL


 


MCH   24.1 L   (27.0-32.0)  pg


 


MCHC   30.8 L   (33.0-38.0)  g/dL


 


RDW Coeff of Jf   19.2 H   (11.0-15.0)  %


 


Plt Count   328   (150-400)  10^3/uL


 


Neut % (Auto)   78.9   (35-85)  %


 


Lymph % (Auto)   13.5   (10-55)  %


 


Mono % (Auto)   6.9   (0-16)  %


 


Eos % (Auto)   0.6   (0-5)  %


 


Baso % (Auto)   0.1   (0-3)  %


 


Neut # (Auto)   11.25 H   (1.80-7.00)  10^3/uL


 


Lymph # (Auto)   1.93   (1.00-4.80)  10^3/uL


 


Mono # (Auto)   0.98 H   (0.00-0.80)  10^3/uL


 


Eos # (Auto)   0.09   (0.00-0.45)  10^3/uL


 


Baso # (Auto)   0.02   10^3/uL


 


Sodium    142  (136-145)  mEq/L


 


Potassium    4.1  (3.5-5.0)  mEq/L


 


Chloride    104  ()  mEq/L


 


Carbon Dioxide    21  (21-32)  mmol/L


 


BUN    12  (7-18)  mg/dL


 


Creatinine    0.7  (0.6-1.0)  mg/dL


 


Est Cr Clr Drug Dosing    95.48  mL/min


 


Estimated GFR (MDRD)    > 60  (>=60)  mL/min


 


Glucose    107 H  (75-99)  mg/dL


 


Calcium    9.2  (8.4-10.1)  mg/dL


 


Total Bilirubin    0.6  (0.0-1.0)  mg/dL


 


AST    22  (15-37)  U/L


 


ALT    27  (12-78)  U/L


 


Alkaline Phosphatase    196 H  ()  U/L


 


C-Reactive Protein    21.2 H  (0.2-0.8)  mg/dL


 


Total Protein    8.0  (6.4-8.2)  g/dL


 


Albumin    3.1 L  (3.4-5.0)  g/dL


 


Urine Color  Yellow    (YELLOW)  


 


Urine Appearance  Clear    (CLEAR)  


 


Urine pH  6.5    (4.5-8.0)  


 


Ur Specific Gravity  >= 1.030 H    (1.003-1.020)  


 


Urine Protein  100 H    (NEGATIVE)  mg/dL


 


Urine Glucose (UA)  Negative    (NEGATIVE)  mg/dL


 


Urine Ketones  Trace H    (NEGATIVE)  mg/dL


 


Urine Occult Blood  Moderate H    (NEGATIVE)  


 


Urine Nitrite  Negative    (NEGATIVE)  


 


Urine Bilirubin  Negative    (NEGATIVE)  


 


Urine Urobilinogen  2.0 H    (0.2-1.0)  EU/dL


 


Ur Leukocyte Esterase  Negative    (NEGATIVE)  


 


Urine RBC  5-10 H    (0-5)  /HPF


 


Urine WBC  Not seen    (0-5)  /HPF


 


Ur Epithelial Cells  Moderate H    (NOT SEEN)  /HPF


 


Urine Bacteria  Few H    (NOT SEEN)  /HPF











Meds: 


Medications











Generic Name Dose Route Start Last Admin





  Trade Name Freq  PRN Reason Stop Dose Admin


 


Fentanyl  50 mcg  19 18:54  19 19:15





  Sublimaze  IVPUSH   50 mcg





  Q6H PRN   Administration





  Pain   





     





     





     














Discontinued Medications














Generic Name Dose Route Start Last Admin





  Trade Name Freq  PRN Reason Stop Dose Admin


 


Fentanyl  25 mcg  19 19:40  19 19:50





  Sublimaze  IVPUSH  19 19:41  25 mcg





  NOW ONE   Administration





     





     





     





     














- Re-Assessments/Exams


Free Text/Narrative Re-Assessment/Exam: 





19


Fluid collections noted per radiologist on CT.  WBC elevated at 14.3, CRP 21.2.

  Hemoglobin 8.5.  Contacted [facility] and spoke with Dr. Joni Morales, surgeon 

on call there in regards to status.  Agreed to accept the patient in transfer 

for surgical consult. 





Departure





- Departure


Time of Disposition: 20:24


Disposition: DC/Tfer to [facility] 


Clinical Impression: 


 Status post panniculectomy, Postoperative bleeding from incision








- Discharge Information


*PRESCRIPTION DRUG MONITORING PROGRAM REVIEWED*: No


*COPY OF PRESCRIPTION DRUG MONITORING REPORT IN PATIENT MEAGAN: No


Forms:  ED Department Discharge


Additional Instructions: 


Transfer BLS to [facility]





- My Orders


Last 24 Hours: 


My Active Orders





19


Abdomen Pelvis w Cont [CT] Routine 





19 18:54


fentaNYL [Sublimaze]   50 mcg IVPUSH Q6H PRN 














- Assessment/Plan


Last 24 Hours: 


My Active Orders





19


Abdomen Pelvis w Cont [CT] Routine 





19 18:54


fentaNYL [Sublimaze]   50 mcg IVPUSH Q6H PRN

## 2020-08-21 ENCOUNTER — OFFICE VISIT (OUTPATIENT)
Dept: NEUROLOGY | Age: 30
End: 2020-08-21
Payer: COMMERCIAL

## 2020-08-21 VITALS
HEART RATE: 104 BPM | SYSTOLIC BLOOD PRESSURE: 122 MMHG | BODY MASS INDEX: 44.53 KG/M2 | HEIGHT: 62 IN | DIASTOLIC BLOOD PRESSURE: 80 MMHG | WEIGHT: 242 LBS | OXYGEN SATURATION: 98 %

## 2020-08-21 DIAGNOSIS — R56.9 SEIZURE (HCC): Primary | ICD-10-CM

## 2020-08-21 PROBLEM — E66.01 OBESITY, MORBID (HCC): Status: ACTIVE | Noted: 2020-08-21

## 2020-08-21 PROCEDURE — 99205 OFFICE O/P NEW HI 60 MIN: CPT | Performed by: PSYCHIATRY & NEUROLOGY

## 2020-08-21 NOTE — PROGRESS NOTES
NEUROLOGY HISTORY AND PHYSICAL    Name Ramon Castillo Age 30 y.o. MRN [id number]  1990     Referring Physician: None      Chief Complaint: seizure     This is a 30 y.o. rgiht handed female with a medical history of a siezure. Single  seizure back in 2018 she was drinking at a birthday party and had a single seizure no recurrence. MRI and EEG reportedly normal. Her neurologist at time was  Angelita Parham. She was not treated with AEDs She has been seizure free since,         Assessment and Plan  1. Seizure ([address]  In the context of her history she had a single islated seizure. She has not been on treatment for       Allergies   Allergen Reactions    Latex Other (comments)    Codeine Other (comments)          Past Medical History:   Diagnosis Date    Essential hypertension     Headache         Social History     Tobacco Use    Smoking status: Smoker, Current Status Unknown     Packs/day: 0.50     Years: 10.00     Pack years: 5.00    Smokeless tobacco: Never Used   Substance Use Topics    Alcohol use: Not Currently    Drug use: Not on file      Family History  Mother and cousin epilepsy  No family history of psychosis    Review of Systems   Constitutional: Negative for chills and fever. HENT: Negative for ear pain. Eyes: Negative for pain and discharge. Respiratory: Negative for cough and hemoptysis. Cardiovascular: Negative for chest pain and claudication. Gastrointestinal: Negative for constipation and diarrhea. Genitourinary: Negative for flank pain and hematuria. Musculoskeletal: Negative for back pain and myalgias. Skin: Negative for itching and rash. Neurological: Negative for headaches. Endo/Heme/Allergies: Negative for environmental allergies. Does not bruise/bleed easily. Psychiatric/Behavioral: Negative for depression and hallucinations.          Exam  Visit Vitals  Ht 5' 2\" (1.575 m)   Wt 242 lb (109.8 kg)   BMI 44.26 kg/m²         General: Well developed, well nourished. Patient in no distress   Head: Normocephalic, atraumatic, anicteric sclera   Neck Normal ROM, No thyromegally   Lungs:  Clear to auscultation    Cardiac: Regular rate and rhythm with no murmurs. Abd: Bowel sounds were audible   Ext: No pedal edema   Skin: Supple no rash     NeurologicExam:  Mental Status: Alert and oriented to person place and time   Speech: Fluent no aphasia or dysarthria. Cranial Nerves:   II-XII Intact    Motor:  Full and symmetric strength of upper and lower ext . Normal bulk and tone. Reflexes:   Deep tendon reflexes 2+/4 and symmetric. Sensory:   Symmetric and intact    Gait:  Gait is balanced    Tremor:   No tremor noted. Cerebellar:  Coordination intact. Neurovascular: No carotid bruits.  No JVD

## 2020-12-08 ENCOUNTER — TELEPHONE (OUTPATIENT)
Dept: NEUROLOGY | Age: 30
End: 2020-12-08

## 2020-12-08 NOTE — TELEPHONE ENCOUNTER
----- Message from Alma Quiros sent at 12/8/2020  2:31 PM EST -----  Regarding: Dr. Fernando Rangel Message/Vendor Calls    Caller's first and last name: Catherine Sung pt's       Reason for call: Patient Misplaced her DMV paperwork that cleared her to drive and would like to know if it can be emailed to her  at [email or web address] or [email or web address]      Callback required yes/no and why: yes      Best contact number(s):[phone]      Details to clarify the request:      Alma Quiros

## 2021-10-04 NOTE — EDM.PDOC
ED HPI GENERAL MEDICAL PROBLEM





- General


Chief Complaint: General


Stated Complaint: knee pain


Time Seen by Provider: 10/04/21 23:38


Source of Information: Reports: Patient


History Limitations: Reports: No Limitations





- History of Present Illness


INITIAL COMMENTS - FREE TEXT/NARRATIVE: 


Lashae is a 29 yo female who presents to the ED via private vehicle with 

complaints of her knee cap dislocating. States her son was playing around with 

her and his head hit her knee cap and pushed it medially. She states it was 

immediate discomfort and reached down and moved the knee cap back. She states it

feels like it is going to go out all the time now. She has history of her right 

knee doing the same when she was younger and ended up having surgical fixation. 

She isn't able to bear any weight. States she hobbled in to the ED after driving

herself here. 


  ** Left Knee


Pain Score (Numeric/FACES): 8





- Related Data


                                    Allergies











Allergy/AdvReac Type Severity Reaction Status Date / Time


 


codeine Allergy  Hives Verified 10/04/21 23:47


 


hydrocodone [From Norco] Allergy  Shortness Verified 10/04/21 23:47





   of Breath  


 


latex Allergy  Anaphylactic Verified 10/04/21 23:47





   Shock  











Home Meds: 


                                    Home Meds





. [No Known Home Meds]  10/04/21 [History]











Past Medical History





- Past Health History


Medical/Surgical History: Denies Medical/Surgical History


HEENT History: Reports: Otitis Media


Cardiovascular History: Reports: Hypertension


Genitourinary History: Reports: None


OB/GYN History: Reports: Polycystic Ovaries, Pregnancy


Other OB/GYN History: 3 c-sections


Musculoskeletal History: Reports: Fracture


Neurological History: Reports: Seizure


Psychiatric History: Reports: Anxiety, Depression


Endocrine/Metabolic History: Reports: Obesity/BMI 30+


Hematologic History: Reports: Anemia





- Infectious Disease History


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





- Past Surgical History


HEENT Surgical History: Reports: Adenoidectomy, Myringotomy w Tube(s), 

Tonsillectomy


Cardiovascular Surgical History: Reports: None


GI Surgical History: Reports: Other (See Below)


Other GI Surgeries/Procedures: punn


Female  Surgical History: Reports:  Section, Tubal Ligation


Endocrine Surgical History: Reports: None


Neurological Surgical History: Reports: None


Musculoskeletal Surgical History: Reports: Other (See Below)


Other Musculoskeletal Surgeries/Procedures:: knee surgery


Dermatological Surgical History: Reports: Other (See Below)





Social & Family History





- Family History


Family Medical History: No Pertinent Family History





- Caffeine Use


Caffeine Use: Reports: Soda





ED ROS GENERAL





- Review of Systems


Review Of Systems: Comprehensive ROS is negative, except as noted in HPI.





ED EXAM, GENERAL





- Physical Exam


Exam: See Below


Exam Limited By: No Limitations


General Appearance: Alert, Mild Distress, Moderate Distress


Ears: Hearing Grossly Normal


Nose: Normal Inspection


Throat/Mouth: Normal Inspection, Normal Voice


Head: Atraumatic, Normocephalic


Respiratory/Chest: No Respiratory Distress, No Accessory Muscle Use


Extremities: Leg Pain (Patient is guarding the left knee not allowing any 

manipulation. There is an effusion under the left patella. Patella appears to be

 hypermobile. Unable to flex knee d/t discomfort. T), Limited Range of Motion


Neurological: Alert, Oriented, Normal Cognition


Psychiatric: Tearful


Skin Exam: Warm, Dry, Intact, Normal Color, No Rash





Course





- Vital Signs


Last Recorded V/S: 


                                Last Vital Signs











Temp  98.2 F   10/04/21 23:42


 


Pulse  110 H  10/04/21 23:42


 


Resp  18   10/04/21 23:42


 


BP  142/85 H  10/04/21 23:42


 


Pulse Ox  97   10/04/21 23:42














- Orders/Labs/Meds


Orders: 


                               Active Orders 24 hr











 Category Date Time Status


 


 Knee 3V Lt [CR] Stat Exams  10/04/21 22:52 Taken











Meds: 


Medications














Discontinued Medications














Generic Name Dose Route Start Last Admin





  Trade Name Boby  PRN Reason Stop Dose Admin


 


Ketorolac Tromethamine  1 packet  10/04/21 23:42 





  Take Home: Ketorolac 10 Mg Tab, 4 Tab Pack  PO  10/04/21 23:43 





  ONETIME ONE  


 


Oxycodone/Acetaminophen  2 packet  10/04/21 23:42 





  Take Home: Acetaminophen/Oxycodone 325-5 Mg, 2 Tab Pack  PO  10/04/21 23:43 





  ONETIME ONE  














Departure





- Departure


Time of Disposition: 23:45


Disposition: Home, Self-Care 01


Clinical Impression: 


Dislocation of patella, left, closed


Qualifiers:


 Encounter type: initial encounter Qualified Code(s): S83.005A - Unspecified 

dislocation of left patella, initial encounter








- Discharge Information


Forms:  ED Department Discharge


Additional Instructions: 


1) Percocet 5/325 - 1 tablet every 6 hours as needed for pain. Only to be used 

for break thru pain





2) Toradol 10mg - 1 tablet every 8 hours as needed for pain. Refrain from taking

 ibuprofen if using the Toradol.





3) Keep knee in the immobilizer





4) Will refer to ortho for further evaluation. Will call tomorrow with 

appointment details. 





5) Ice 20 minutes at a time, 4-5 times a day





6) Keep leg elevated when sitting/lying.





7) Advise using the crutches and refrain from weight bearing. 





Sepsis Event Note (ED)





- Focused Exam


Vital Signs: 


                                   Vital Signs











  Temp Pulse Resp BP Pulse Ox


 


 10/04/21 23:42  98.2 F  110 H  18  142/85 H  97














- Problem List & Annotations


(1) Dislocation of patella, left, closed


SNOMED Code(s): 227444638, [id number]


   Code(s): S83.005A - UNSPECIFIED DISLOCATION OF LEFT PATELLA, INITIAL 

ENCOUNTER   Status: Acute   Current Visit: Yes   


Qualifiers: 


   Encounter type: initial encounter   Qualified Code(s): S83.005A - Unspecified

 dislocation of left patella, initial encounter   





- My Orders


Last 24 Hours: 


My Active Orders





10/04/21 22:52


Knee 3V Lt [CR] Stat 














- Assessment/Plan


Last 24 Hours: 


My Active Orders





10/04/21 22:52


Knee 3V Lt [CR] Stat 











Plan: 


Lashae appears to be in significant discomfort. Initially 8/10 and 6/10 after

 immobilizer in place. Patient has allergy to codeine and hydrocodone, admits 

she is able to tae Percocet. Percocet given only for break thru pain. Total qty 

of 4 and Toradol to be taken every 8 hours as needed as well. Knee immobilizer 

was placed and crutches fitted. Will have patient referred to ortho for further 

evaluation/treatment.